# Patient Record
Sex: MALE | Race: WHITE | NOT HISPANIC OR LATINO
[De-identification: names, ages, dates, MRNs, and addresses within clinical notes are randomized per-mention and may not be internally consistent; named-entity substitution may affect disease eponyms.]

---

## 2019-05-30 ENCOUNTER — TRANSCRIPTION ENCOUNTER (OUTPATIENT)
Age: 69
End: 2019-05-30

## 2019-05-30 NOTE — ASU PATIENT PROFILE, ADULT - VISION (WITH CORRECTIVE LENSES IF THE PATIENT USUALLY WEARS THEM):
Partially impaired: cannot see medication labels or newsprint, but can see obstacles in path, and the surrounding layout; can count fingers at arm's length/cataract right eye

## 2019-05-31 ENCOUNTER — OUTPATIENT (OUTPATIENT)
Dept: OUTPATIENT SERVICES | Facility: HOSPITAL | Age: 69
LOS: 1 days | End: 2019-05-31
Payer: MEDICARE

## 2019-05-31 VITALS
DIASTOLIC BLOOD PRESSURE: 91 MMHG | WEIGHT: 181.22 LBS | HEIGHT: 67 IN | OXYGEN SATURATION: 97 % | RESPIRATION RATE: 16 BRPM | HEART RATE: 77 BPM | SYSTOLIC BLOOD PRESSURE: 144 MMHG | TEMPERATURE: 99 F

## 2019-05-31 VITALS
OXYGEN SATURATION: 97 % | SYSTOLIC BLOOD PRESSURE: 140 MMHG | RESPIRATION RATE: 16 BRPM | DIASTOLIC BLOOD PRESSURE: 86 MMHG | HEART RATE: 66 BPM

## 2019-05-31 DIAGNOSIS — H25.11 AGE-RELATED NUCLEAR CATARACT, RIGHT EYE: ICD-10-CM

## 2019-05-31 PROCEDURE — V2787: CPT

## 2019-05-31 PROCEDURE — 66984 XCAPSL CTRC RMVL W/O ECP: CPT | Mod: RT

## 2019-05-31 NOTE — ASU DISCHARGE PLAN (ADULT/PEDIATRIC) - CARE PROVIDER_API CALL
Goldberg, Leslie P (MD)  Ophthalmology  [address] [place] [address], [address]  [place] [address]  Phone: [phone]  Fax: [phone]  Follow Up Time:

## 2019-05-31 NOTE — ASU DISCHARGE PLAN (ADULT/PEDIATRIC) - CALL YOUR DOCTOR IF YOU HAVE ANY OF THE FOLLOWING:
Nausea and vomiting that does not stop/Fever greater than (need to indicate Fahrenheit or Celsius)/Pain not relieved by Medications/Bleeding that does not stop/Swelling that gets worse

## 2019-05-31 NOTE — ASU DISCHARGE PLAN (ADULT/PEDIATRIC) - NURSING INSTRUCTIONS
Do not rub the eye   f/u with dr goldberg on 6/1/19 Do not rub the eye   f/u with dr goldberg on 6/1/19 @8:15am

## 2021-03-27 ENCOUNTER — TRANSCRIPTION ENCOUNTER (OUTPATIENT)
Age: 71
End: 2021-03-27

## 2023-08-01 PROBLEM — T14.8XXA OTHER INJURY OF UNSPECIFIED BODY REGION, INITIAL ENCOUNTER: Chronic | Status: ACTIVE | Noted: 2019-05-31

## 2023-08-10 ENCOUNTER — APPOINTMENT (OUTPATIENT)
Dept: PODIATRY | Facility: CLINIC | Age: 73
End: 2023-08-10
Payer: MEDICARE

## 2023-08-10 PROBLEM — Z00.00 ENCOUNTER FOR PREVENTIVE HEALTH EXAMINATION: Status: ACTIVE | Noted: 2023-08-10

## 2023-08-10 PROCEDURE — 11720 DEBRIDE NAIL 1-5: CPT

## 2023-08-10 PROCEDURE — 99203 OFFICE O/P NEW LOW 30 MIN: CPT | Mod: 25

## 2023-08-10 RX ORDER — CICLOPIROX 80 MG/ML
8 SOLUTION TOPICAL
Qty: 1 | Refills: 3 | Status: ACTIVE | COMMUNITY
Start: 2023-08-10 | End: 1900-01-01

## 2023-08-17 NOTE — HISTORY OF PRESENT ILLNESS
[FreeTextEntry1] : Patient presents today with complaint of thickened nails which are difficult for the patient to cut and manage himself. When he was on a trip he was doing a lot of walking. He noticed increased irritation to his right hallucal nail in the shoe. He denies any trauma. He is not aware that there was yellowing and thickening of the nail. Patient has not tried anything for this issue.

## 2023-08-17 NOTE — PHYSICAL EXAM
[2+] : left foot dorsalis pedis 2+ [No Focal Deficits] : no focal deficits [Varicose Veins Of Lower Extremities] : not present [Ankle Swelling (On Exam)] : not present [] : not present [Delayed in the Right Toes] : capillary refills normal in right toes [Delayed in the Left Toes] : capillary refills normal in the left toes [FreeTextEntry3] : CFT < 3 seconds.  Temperature gradient normal.  [de-identified] : ROM of the ankle and subtalar joints bilaterally are restricted, non-painful and non-crepitant. Muscle power 5/5 of all pedal groups bilaterally. Left 1st MPJ dorsal bony prominence with no pain on palpation. Decreased ROM without any clicking or crepitus. [FreeTextEntry1] : Light touch intact.

## 2023-08-17 NOTE — ASSESSMENT
[FreeTextEntry1] : Impression: Painful onychomycosis. Left 1st IPJ hallux limitus.  Treatment: Discussed etiology and treatment options with the patient.  I advised patient to do ankle and foot ROM exercises daily. He states that he sits in a certain position during the day mostly and it causes him stiffness. He denies any current pains. If he has any pain I advised patient to return back for imaging and further work-up. For hallux limitus I advised patient to wear shoes with a wide and tall toe box to accommodate the deformity. I gave the patient off-loading pads to use as well. For onychomycosis, the right hallucal nail was debrided of excessive thickness and length to appropriate level of comfort and contour using sterile nippers and Dremel.  The procedure was tolerated well without complications. The rest of the digits were trimmed to hygienic length. Patient was prescribed topical Ciclopirox which was sent to his pharmacy. Will see the patient back in 3 months.

## 2023-10-13 ENCOUNTER — APPOINTMENT (OUTPATIENT)
Dept: PODIATRY | Facility: CLINIC | Age: 73
End: 2023-10-13

## 2023-10-25 ENCOUNTER — APPOINTMENT (OUTPATIENT)
Dept: PODIATRY | Facility: CLINIC | Age: 73
End: 2023-10-25
Payer: MEDICARE

## 2023-10-25 PROCEDURE — 11720 DEBRIDE NAIL 1-5: CPT

## 2024-01-10 ENCOUNTER — APPOINTMENT (OUTPATIENT)
Dept: PODIATRY | Facility: CLINIC | Age: 74
End: 2024-01-10
Payer: MEDICARE

## 2024-01-10 DIAGNOSIS — R23.4 CHANGES IN SKIN TEXTURE: ICD-10-CM

## 2024-01-10 PROCEDURE — 99212 OFFICE O/P EST SF 10 MIN: CPT | Mod: 25

## 2024-01-10 PROCEDURE — 11720 DEBRIDE NAIL 1-5: CPT

## 2024-01-16 PROBLEM — R23.4 SKIN FISSURE: Status: ACTIVE | Noted: 2024-01-11

## 2024-01-16 NOTE — HISTORY OF PRESENT ILLNESS
[Sneakers] : ryann [FreeTextEntry1] : Patient returns today for follow up of painful, thickened onychomycotic nails that are difficult for him to cut.  He has been using topical Ciclopirox since August and reports improvement in the color of the nails but not the thickness.  He has also noticed increasing dryness to bilateral heels and cracking of the skin.  Denies any redness or drainage.  He has been using an over-the-counter moisturizer but not consistently.  He reports no new medical problems.

## 2024-01-16 NOTE — PHYSICAL EXAM
[2+] : left foot dorsalis pedis 2+ [No Focal Deficits] : no focal deficits [Ankle Swelling (On Exam)] : not present [Varicose Veins Of Lower Extremities] : not present [] : not present [FreeTextEntry3] : CFT: 3 seconds x 10.  Temperature gradient: normal.   [de-identified] : ROM of the ankle and subtalar joints bilaterally are restricted, non-painful and non-crepitant. Muscle power 5/5 of all pedal groups bilaterally. Left 1st MPJ dorsal bony prominence with no pain on palpation. Decreased ROM without any clicking or crepitus. [FreeTextEntry1] : Light touch intact.

## 2024-01-16 NOTE — ASSESSMENT
[FreeTextEntry1] : Impression: Painful onychomycosis (B35.1) (M79.674, M79.675).  Xerosis (L85.3).  Skin fissures (R23.4).  Treatment:  For the xerosis and skin fissures, I advised patient to exfoliate after showering with a pumice stone and apply over-the-counter moisturizers, such as Aveeno, Gold Bond or AmLactin, daily.   The hyperkeratotic skin of the skin fissures was wiped with alcohol and shaved with sterile #23 blade.   Right hallux nail was debrided of excessive thickness and length to appropriate levels of comfort and contour using sterile nippers and Dremel.   The procedure was tolerated well without complications.   Advised patient to continue using the topical Ciclopirox and continue to clean fomites.   Return: 3 months.

## 2024-04-18 ENCOUNTER — APPOINTMENT (OUTPATIENT)
Dept: PODIATRY | Facility: CLINIC | Age: 74
End: 2024-04-18
Payer: MEDICARE

## 2024-04-18 DIAGNOSIS — L85.3 XEROSIS CUTIS: ICD-10-CM

## 2024-04-18 DIAGNOSIS — M79.675 PAIN IN LEFT TOE(S): ICD-10-CM

## 2024-04-18 DIAGNOSIS — B35.1 TINEA UNGUIUM: ICD-10-CM

## 2024-04-18 DIAGNOSIS — M79.674 PAIN IN RIGHT TOE(S): ICD-10-CM

## 2024-04-18 PROCEDURE — 99212 OFFICE O/P EST SF 10 MIN: CPT | Mod: 25

## 2024-04-18 PROCEDURE — 11720 DEBRIDE NAIL 1-5: CPT

## 2024-04-22 PROBLEM — B35.1 ONYCHOMYCOSIS: Status: ACTIVE | Noted: 2023-08-10

## 2024-04-22 PROBLEM — L85.3 XEROSIS CUTIS: Status: ACTIVE | Noted: 2024-01-11

## 2024-04-22 PROBLEM — M79.674 PAIN OF TOE OF RIGHT FOOT: Status: ACTIVE | Noted: 2023-08-14

## 2024-04-22 PROBLEM — M79.675 PAIN OF TOE OF LEFT FOOT: Status: ACTIVE | Noted: 2023-10-26

## 2024-04-23 NOTE — HISTORY OF PRESENT ILLNESS
[FreeTextEntry1] : Patient presents today for follow up of mycotic nails.  He states that he has been using medications; he discontinued, since the last visit and he had seen improvement since using the medications.  At least there is enough outgrowth of nail so that he has discontinued it.  He denies any other changes to his medical history.  He has some dryness on the bottom of both feet, which bothers him in bed.  He states that there was a small piece of skin on the right foot, which he peeled off; he denies any bleeding at the time.  Denies any fever, chills or drainage from the area.

## 2024-04-23 NOTE — PHYSICAL EXAM
[2+] : left foot dorsalis pedis 2+ [No Focal Deficits] : no focal deficits [Ankle Swelling (On Exam)] : not present [Varicose Veins Of Lower Extremities] : not present [] : not present [FreeTextEntry3] : CFT: 3 seconds x 10.  Temperature gradient: normal.   [de-identified] : ROM of the ankle and subtalar joints bilaterally are restricted, non-painful and non-crepitant. Muscle power 5/5 of all pedal groups bilaterally. Left 1st MPJ dorsal bony prominence with no pain on palpation. Decreased ROM without any clicking or crepitus. [FreeTextEntry1] : Light touch and epicrictic sensations are intact.

## 2024-04-23 NOTE — ASSESSMENT
[FreeTextEntry1] : Impression: Painful onychomycosis (B35.1) (M79.674, M79.675).  Xerosis (L85.3).   Treatment: Discussed findings and conditions with the patient.  The mycotic bilateral hallux nails were prepped and manually and mechanically debrided to patient's tolerance.  Subungual debris was curettaged at the fibular nail borders, right greater than left.  Nails were smoothed with a rotary anali.  The remaining nails were trimmed to appropriate hygienic length.   The hyperkeratotic skin was prepped and trimmed with a #23 blade without incident.  Discussed moisturizing cream with the patient; he states he has at home and will continue to use it.  There is a small area where the skin had been peeled.  Negative ulcerations, bleeding, breaks in skin.   Return: 2 - 3 months.  With any pain, problems or concerns, patient is to contact the office.

## 2024-07-12 ENCOUNTER — APPOINTMENT (OUTPATIENT)
Dept: PODIATRY | Facility: CLINIC | Age: 74
End: 2024-07-12
Payer: MEDICARE

## 2024-07-12 DIAGNOSIS — B35.1 TINEA UNGUIUM: ICD-10-CM

## 2024-07-12 DIAGNOSIS — B35.3 TINEA PEDIS: ICD-10-CM

## 2024-07-12 DIAGNOSIS — M79.675 PAIN IN LEFT TOE(S): ICD-10-CM

## 2024-07-12 DIAGNOSIS — M79.674 PAIN IN RIGHT TOE(S): ICD-10-CM

## 2024-07-12 PROCEDURE — 99212 OFFICE O/P EST SF 10 MIN: CPT | Mod: 25

## 2024-07-12 PROCEDURE — 11720 DEBRIDE NAIL 1-5: CPT

## 2024-10-11 ENCOUNTER — APPOINTMENT (OUTPATIENT)
Dept: PODIATRY | Facility: CLINIC | Age: 74
End: 2024-10-11
Payer: MEDICARE

## 2024-10-11 DIAGNOSIS — B35.1 TINEA UNGUIUM: ICD-10-CM

## 2024-10-11 PROCEDURE — 11720 DEBRIDE NAIL 1-5: CPT

## 2024-12-17 ENCOUNTER — APPOINTMENT (OUTPATIENT)
Dept: PODIATRY | Facility: CLINIC | Age: 74
End: 2024-12-17

## 2024-12-17 DIAGNOSIS — M79.674 PAIN IN RIGHT TOE(S): ICD-10-CM

## 2024-12-17 DIAGNOSIS — M79.675 PAIN IN LEFT TOE(S): ICD-10-CM

## 2024-12-17 DIAGNOSIS — R23.4 CHANGES IN SKIN TEXTURE: ICD-10-CM

## 2024-12-17 DIAGNOSIS — L85.3 XEROSIS CUTIS: ICD-10-CM

## 2024-12-17 DIAGNOSIS — B35.1 TINEA UNGUIUM: ICD-10-CM

## 2024-12-17 PROCEDURE — 99212 OFFICE O/P EST SF 10 MIN: CPT | Mod: 25

## 2024-12-17 PROCEDURE — 11720 DEBRIDE NAIL 1-5: CPT

## 2025-02-20 ENCOUNTER — APPOINTMENT (OUTPATIENT)
Dept: PODIATRY | Facility: CLINIC | Age: 75
End: 2025-02-20
Payer: MEDICARE

## 2025-02-20 DIAGNOSIS — L85.3 XEROSIS CUTIS: ICD-10-CM

## 2025-02-20 DIAGNOSIS — R23.4 CHANGES IN SKIN TEXTURE: ICD-10-CM

## 2025-02-20 PROCEDURE — 99213 OFFICE O/P EST LOW 20 MIN: CPT

## 2025-05-22 ENCOUNTER — APPOINTMENT (OUTPATIENT)
Dept: PODIATRY | Facility: CLINIC | Age: 75
End: 2025-05-22
Payer: MEDICARE

## 2025-05-22 DIAGNOSIS — L85.3 XEROSIS CUTIS: ICD-10-CM

## 2025-05-22 DIAGNOSIS — R23.4 CHANGES IN SKIN TEXTURE: ICD-10-CM

## 2025-05-22 PROCEDURE — 99212 OFFICE O/P EST SF 10 MIN: CPT

## 2025-08-21 ENCOUNTER — APPOINTMENT (OUTPATIENT)
Dept: PODIATRY | Facility: CLINIC | Age: 75
End: 2025-08-21

## 2025-08-21 DIAGNOSIS — M79.675 PAIN IN LEFT TOE(S): ICD-10-CM

## 2025-08-21 DIAGNOSIS — L60.0 INGROWING NAIL: ICD-10-CM

## 2025-08-21 PROCEDURE — 99212 OFFICE O/P EST SF 10 MIN: CPT

## 2025-08-29 PROBLEM — L60.0 INGROWN NAIL: Status: ACTIVE | Noted: 2025-08-29
